# Patient Record
Sex: MALE | Race: WHITE | NOT HISPANIC OR LATINO
[De-identification: names, ages, dates, MRNs, and addresses within clinical notes are randomized per-mention and may not be internally consistent; named-entity substitution may affect disease eponyms.]

---

## 2017-01-04 ENCOUNTER — MYAURORA ACCOUNT LINK (OUTPATIENT)
Dept: OTHER | Age: 77
End: 2017-01-04

## 2017-01-04 ENCOUNTER — PRIOR ORIGINAL RECORDS (OUTPATIENT)
Dept: OTHER | Age: 77
End: 2017-01-04

## 2017-01-10 ENCOUNTER — HOSPITAL ENCOUNTER (OUTPATIENT)
Dept: CV DIAGNOSTICS | Age: 77
Discharge: HOME | End: 2017-01-10
Attending: INTERNAL MEDICINE
Payer: MEDICARE

## 2017-01-10 DIAGNOSIS — R55 SYNCOPE: ICD-10-CM

## 2017-01-10 PROCEDURE — 93226 XTRNL ECG REC<48 HR SCAN A/R: CPT

## 2017-01-10 PROCEDURE — 93225 XTRNL ECG REC<48 HRS REC: CPT

## 2017-01-10 PROCEDURE — 93227 XTRNL ECG REC<48 HR R&I: CPT | Performed by: INTERNAL MEDICINE

## 2017-01-17 ENCOUNTER — PRIOR ORIGINAL RECORDS (OUTPATIENT)
Dept: OTHER | Age: 77
End: 2017-01-17

## 2017-02-02 ENCOUNTER — TELEPHONE (OUTPATIENT)
Dept: FAMILY MEDICINE CLINIC | Facility: CLINIC | Age: 77
End: 2017-02-02

## 2017-02-02 NOTE — TELEPHONE ENCOUNTER
Requesting Clopidogrel  LOV: 12/29/16  RTC: not noted  Last Labs: n/a  Filled: 8/30/16 #90 with 1 refills    Future Appointments  Date Time Provider Ritesh Mary   2/14/2017 1:30 PM Rufus Gutierrez MD [facility] [address] [facility] [address]   7/18/2017 1:30 PM Rufus Gutierrez MD

## 2017-02-05 RX ORDER — CLOPIDOGREL BISULFATE 75 MG/1
75 TABLET ORAL DAILY
Qty: 90 TABLET | Refills: 1 | Status: SHIPPED | OUTPATIENT
Start: 2017-02-05 | End: 2017-09-07

## 2017-02-14 ENCOUNTER — OFFICE VISIT (OUTPATIENT)
Dept: FAMILY MEDICINE CLINIC | Facility: CLINIC | Age: 77
End: 2017-02-14

## 2017-02-14 ENCOUNTER — MYAURORA ACCOUNT LINK (OUTPATIENT)
Dept: OTHER | Age: 77
End: 2017-02-14

## 2017-02-14 ENCOUNTER — PRIOR ORIGINAL RECORDS (OUTPATIENT)
Dept: OTHER | Age: 77
End: 2017-02-14

## 2017-02-14 VITALS
HEIGHT: 73 IN | BODY MASS INDEX: 26.77 KG/M2 | RESPIRATION RATE: 16 BRPM | TEMPERATURE: 98 F | HEART RATE: 60 BPM | SYSTOLIC BLOOD PRESSURE: 136 MMHG | DIASTOLIC BLOOD PRESSURE: 82 MMHG | WEIGHT: 202 LBS

## 2017-02-14 DIAGNOSIS — M54.50 CHRONIC MIDLINE LOW BACK PAIN WITHOUT SCIATICA: ICD-10-CM

## 2017-02-14 DIAGNOSIS — G89.29 CHRONIC MIDLINE LOW BACK PAIN WITHOUT SCIATICA: ICD-10-CM

## 2017-02-14 DIAGNOSIS — R30.0 DYSURIA: ICD-10-CM

## 2017-02-14 DIAGNOSIS — R73.01 IFG (IMPAIRED FASTING GLUCOSE): ICD-10-CM

## 2017-02-14 DIAGNOSIS — I10 ESSENTIAL HYPERTENSION, BENIGN: Primary | ICD-10-CM

## 2017-02-14 LAB
APPEARANCE: CLEAR
MULTISTIX LOT#: NORMAL NUMERIC
PH, URINE: 6.5 (ref 4.5–8)
PROTEIN (URINE DIPSTICK): 30 MG/DL
SPECIFIC GRAVITY: 1.02 (ref 1–1.03)
URINE-COLOR: YELLOW
UROBILINOGEN,SEMI-QN: 0.2 MG/DL (ref 0–1.9)

## 2017-02-14 PROCEDURE — 87086 URINE CULTURE/COLONY COUNT: CPT | Performed by: INTERNAL MEDICINE

## 2017-02-14 PROCEDURE — 99214 OFFICE O/P EST MOD 30 MIN: CPT | Performed by: INTERNAL MEDICINE

## 2017-02-14 PROCEDURE — 81003 URINALYSIS AUTO W/O SCOPE: CPT | Performed by: INTERNAL MEDICINE

## 2017-02-14 RX ORDER — LISINOPRIL 20 MG/1
20 TABLET ORAL DAILY
COMMUNITY
End: 2017-10-10

## 2017-02-14 RX ORDER — HYDROCODONE BITARTRATE AND ACETAMINOPHEN 5; 325 MG/1; MG/1
1 TABLET ORAL EVERY 6 HOURS PRN
Qty: 90 TABLET | Refills: 0 | Status: SHIPPED | OUTPATIENT
Start: 2017-02-14 | End: 2017-05-23

## 2017-02-14 NOTE — PROGRESS NOTES
CC: Patient presents with:  Medication Follow-Up       HPI:     Was diagnosed with September in 2017 with UTI  Having burning sensation and odor again and feels that he may have recurrence     Had lipid panel is 2017 with cards     Was placed on 24 erika mouth daily. Disp:  Rfl:    Ferrous Sulfate (IRON) 325 (65 FE) MG Oral Tab Take  by mouth. Disp:  Rfl:    Atorvastatin Calcium (LIPITOR) 40 MG Oral Tab Take 40 mg by mouth daily. Disp:  Rfl:    Multiple Vitamin (MULTI-VITAMIN OR) Take  by mouth.  Disp:  Rfl Temp(Src) 98 °F (36.7 °C) (Oral)  Resp 16  Ht 73\"  Wt 202 lb  BMI 26.66 kg/m2  GENERAL: well developed, well nourished,in no apparent distress, A/O x3  SKIN: no rashes,no suspicious lesions  HEENT: atraumatic, normocephalic, OP-clear, PERRLA  NECK: supple

## 2017-03-29 RX ORDER — EZETIMIBE 10 MG/1
10 TABLET ORAL NIGHTLY
Qty: 90 TABLET | Refills: 1 | Status: CANCELLED | OUTPATIENT
Start: 2017-03-29

## 2017-03-29 NOTE — TELEPHONE ENCOUNTER
Requesting: Ezetimibe 10mg  LOV: 2/14/17  RTC: 6 months  Last Labs: 12/30/16   Filled: 0 # 0 with 0 refills    Future Appointments  Date Time Provider Ritesh Hernandez   4/25/2017 10:30 AM PF CARD /ECHO ROOM 1 PF CARD [place]   8/28/2017 10:00 AM Alfred Flower

## 2017-03-31 NOTE — TELEPHONE ENCOUNTER
I spoke with patient and he DOES get medication with Dr. Wilmer Oliveira, cardiology. He already spoke with the pharmacy to let them know it needs to go through him.

## 2017-04-01 ENCOUNTER — MED REC SCAN ONLY (OUTPATIENT)
Dept: FAMILY MEDICINE CLINIC | Facility: CLINIC | Age: 77
End: 2017-04-01

## 2017-04-25 ENCOUNTER — HOSPITAL ENCOUNTER (OUTPATIENT)
Dept: CV DIAGNOSTICS | Age: 77
Discharge: HOME | End: 2017-04-25
Attending: INTERNAL MEDICINE
Payer: MEDICARE

## 2017-04-25 ENCOUNTER — PRIOR ORIGINAL RECORDS (OUTPATIENT)
Dept: OTHER | Age: 77
End: 2017-04-25

## 2017-04-25 ENCOUNTER — MYAURORA ACCOUNT LINK (OUTPATIENT)
Dept: OTHER | Age: 77
End: 2017-04-25

## 2017-04-25 DIAGNOSIS — I65.23 BILATERAL CAROTID ARTERY STENOSIS: ICD-10-CM

## 2017-04-26 ENCOUNTER — TELEPHONE (OUTPATIENT)
Dept: FAMILY MEDICINE CLINIC | Facility: CLINIC | Age: 77
End: 2017-04-26

## 2017-04-27 ENCOUNTER — PRIOR ORIGINAL RECORDS (OUTPATIENT)
Dept: OTHER | Age: 77
End: 2017-04-27

## 2017-04-28 ENCOUNTER — PRIOR ORIGINAL RECORDS (OUTPATIENT)
Dept: OTHER | Age: 77
End: 2017-04-28

## 2017-04-28 ENCOUNTER — MYAURORA ACCOUNT LINK (OUTPATIENT)
Dept: OTHER | Age: 77
End: 2017-04-28

## 2017-05-09 ENCOUNTER — HOSPITAL ENCOUNTER (OUTPATIENT)
Dept: CT IMAGING | Age: 77
Discharge: HOME | End: 2017-05-09
Attending: INTERNAL MEDICINE
Payer: MEDICARE

## 2017-05-09 ENCOUNTER — NURSE ONLY (OUTPATIENT)
Dept: LAB | Age: 77
End: 2017-05-09
Attending: INTERNAL MEDICINE
Payer: MEDICARE

## 2017-05-09 ENCOUNTER — PRIOR ORIGINAL RECORDS (OUTPATIENT)
Dept: OTHER | Age: 77
End: 2017-05-09

## 2017-05-09 DIAGNOSIS — I65.29 CAROTID ARTERY STENOSIS: ICD-10-CM

## 2017-05-09 DIAGNOSIS — Z01.818 PRE-OPERATIVE EXAM: Primary | ICD-10-CM

## 2017-05-09 PROCEDURE — 70498 CT ANGIOGRAPHY NECK: CPT | Performed by: INTERNAL MEDICINE

## 2017-05-09 PROCEDURE — 36415 COLL VENOUS BLD VENIPUNCTURE: CPT

## 2017-05-09 PROCEDURE — 80048 BASIC METABOLIC PNL TOTAL CA: CPT

## 2017-05-10 LAB
BUN: 29 MG/DL
CALCIUM: 10.4 MG/DL
CHLORIDE: 108 MEQ/L
CREATININE, SERUM: 1.56 MG/DL
GLUCOSE: 107 MG/DL
POTASSIUM, SERUM: 4.3 MEQ/L
SODIUM: 140 MEQ/L

## 2017-05-11 ENCOUNTER — PRIOR ORIGINAL RECORDS (OUTPATIENT)
Dept: OTHER | Age: 77
End: 2017-05-11

## 2017-05-16 ENCOUNTER — PRIOR ORIGINAL RECORDS (OUTPATIENT)
Dept: OTHER | Age: 77
End: 2017-05-16

## 2017-05-16 ENCOUNTER — LAB ENCOUNTER (OUTPATIENT)
Dept: LAB | Age: 77
End: 2017-05-16
Attending: INTERNAL MEDICINE
Payer: MEDICARE

## 2017-05-16 DIAGNOSIS — N18.9 CHRONIC KIDNEY DISEASE, UNSPECIFIED: ICD-10-CM

## 2017-05-16 DIAGNOSIS — I25.10 CORONARY ATHEROSCLEROSIS OF NATIVE CORONARY ARTERY: Primary | ICD-10-CM

## 2017-05-16 PROCEDURE — 36415 COLL VENOUS BLD VENIPUNCTURE: CPT

## 2017-05-16 PROCEDURE — 80048 BASIC METABOLIC PNL TOTAL CA: CPT

## 2017-05-17 LAB
BUN: 23 MG/DL
CALCIUM: 10.4 MG/DL
CHLORIDE: 111 MEQ/L
CREATININE, SERUM: 1.32 MG/DL
GLUCOSE: 97 MG/DL
POTASSIUM, SERUM: 4.2 MEQ/L
SODIUM: 144 MEQ/L

## 2017-05-18 ENCOUNTER — TELEPHONE (OUTPATIENT)
Dept: FAMILY MEDICINE CLINIC | Facility: CLINIC | Age: 77
End: 2017-05-18

## 2017-05-18 RX ORDER — CILOSTAZOL 100 MG/1
100 TABLET ORAL 2 TIMES DAILY
Qty: 180 TABLET | Refills: 0 | Status: SHIPPED | OUTPATIENT
Start: 2017-05-18 | End: 2017-05-18

## 2017-05-18 RX ORDER — CILOSTAZOL 100 MG/1
100 TABLET ORAL 2 TIMES DAILY
Qty: 180 TABLET | Refills: 0 | Status: SHIPPED | OUTPATIENT
Start: 2017-05-18 | End: 2017-05-23

## 2017-05-18 NOTE — TELEPHONE ENCOUNTER
I spoke to the patient to let him know the refill was sent to his local pharmacy, he said it was to be sent to mail order. He will be out of med so I called [facility] to okay a 2 week supply since  Already approved.   [facility] will change to #14 and I tried to loc

## 2017-05-18 NOTE — TELEPHONE ENCOUNTER
We do not get samples of pletal, I called patient to discuss. We could send a short-term supply to local pharmacy? Refill request received 5/16/17 and pended to MD - awaiting approval.  Patient does not answer at phone number given.

## 2017-05-23 ENCOUNTER — OFFICE VISIT (OUTPATIENT)
Dept: FAMILY MEDICINE CLINIC | Facility: CLINIC | Age: 77
End: 2017-05-23

## 2017-05-23 VITALS
SYSTOLIC BLOOD PRESSURE: 128 MMHG | BODY MASS INDEX: 26.75 KG/M2 | HEART RATE: 72 BPM | DIASTOLIC BLOOD PRESSURE: 74 MMHG | WEIGHT: 201.81 LBS | HEIGHT: 73 IN | TEMPERATURE: 98 F | RESPIRATION RATE: 16 BRPM

## 2017-05-23 DIAGNOSIS — M54.50 CHRONIC MIDLINE LOW BACK PAIN WITHOUT SCIATICA: Primary | ICD-10-CM

## 2017-05-23 DIAGNOSIS — I65.23 BILATERAL CAROTID ARTERY STENOSIS: ICD-10-CM

## 2017-05-23 DIAGNOSIS — R30.0 DYSURIA: ICD-10-CM

## 2017-05-23 DIAGNOSIS — G89.29 CHRONIC MIDLINE LOW BACK PAIN WITHOUT SCIATICA: Primary | ICD-10-CM

## 2017-05-23 PROBLEM — I65.29 CAROTID ARTERY STENOSIS: Status: ACTIVE | Noted: 2017-05-23

## 2017-05-23 PROCEDURE — 99214 OFFICE O/P EST MOD 30 MIN: CPT | Performed by: INTERNAL MEDICINE

## 2017-05-23 RX ORDER — HYDROCODONE BITARTRATE AND ACETAMINOPHEN 5; 325 MG/1; MG/1
1 TABLET ORAL EVERY 6 HOURS PRN
Qty: 90 TABLET | Refills: 0 | Status: SHIPPED | OUTPATIENT
Start: 2017-05-23 | End: 2018-01-01

## 2017-05-23 RX ORDER — CILOSTAZOL 100 MG/1
100 TABLET ORAL 2 TIMES DAILY
Qty: 180 TABLET | Refills: 2 | Status: SHIPPED | OUTPATIENT
Start: 2017-05-23 | End: 2018-01-01

## 2017-05-23 NOTE — PROGRESS NOTES
CC: Patient presents with:  Medication Follow-Up: need refills on Norco  Other: discuss back and carotid artery       HPI:     Had carotid US and CTA   Recommended to see Dr. Quiana Estess. Blockage went from 59-63%   Denies any lightheadedness or dizziness. 10 mg by mouth nightly.  Disp:  Rfl:       Past Medical History   Diagnosis Date   • Other abnormal glucose    • Acute pharyngitis    • Benign neoplasm of adrenal gland    • Hypertrophy of prostate without urinary obstruction and other lower urinary tract s adenopathy  LUNGS: clear to auscultation bilaterally   CARDIO: RRR without murmur  GI: good BS's,NT/ND, no masses or HSM  EXTREMITIES: no cyanosis, no clubbing, no edema      Orders Placed This Encounter  UA/M With Culture Reflex [E]  Standing Status: Futu

## 2017-05-24 ENCOUNTER — APPOINTMENT (OUTPATIENT)
Dept: LAB | Age: 77
End: 2017-05-24
Attending: INTERNAL MEDICINE
Payer: MEDICARE

## 2017-05-24 DIAGNOSIS — R30.0 DYSURIA: ICD-10-CM

## 2017-05-24 PROCEDURE — 81003 URINALYSIS AUTO W/O SCOPE: CPT | Performed by: INTERNAL MEDICINE

## 2017-06-20 RX ORDER — MONTELUKAST SODIUM 10 MG/1
10 TABLET ORAL DAILY
Qty: 90 TABLET | Refills: 3 | Status: SHIPPED | OUTPATIENT
Start: 2017-06-20 | End: 2018-01-01

## 2017-06-20 NOTE — TELEPHONE ENCOUNTER
Refill protocol failed because the patient did not meet the protocol criteria.     Requesting singulair 10mg  LOV: 5/23/17  RTC: 3 months  Last Labs:   Filled: 6/24/16 # 90  with 3 refills    Future Appointments  Date Time Provider Ritesh Hernandez   7/18/

## 2017-07-10 PROBLEM — I50.22 CHRONIC SYSTOLIC CONGESTIVE HEART FAILURE (HCC): Status: ACTIVE | Noted: 2017-07-10

## 2017-07-10 PROBLEM — I25.810 CORONARY ARTERY DISEASE INVOLVING CORONARY BYPASS GRAFT OF NATIVE HEART WITHOUT ANGINA PECTORIS: Status: ACTIVE | Noted: 2017-07-10

## 2017-07-17 ENCOUNTER — OFFICE VISIT (OUTPATIENT)
Dept: FAMILY MEDICINE CLINIC | Facility: CLINIC | Age: 77
End: 2017-07-17

## 2017-07-17 VITALS
WEIGHT: 193 LBS | BODY MASS INDEX: 25.58 KG/M2 | DIASTOLIC BLOOD PRESSURE: 60 MMHG | TEMPERATURE: 97 F | SYSTOLIC BLOOD PRESSURE: 100 MMHG | HEIGHT: 73 IN | HEART RATE: 112 BPM | RESPIRATION RATE: 16 BRPM

## 2017-07-17 DIAGNOSIS — I25.810 CORONARY ARTERY DISEASE INVOLVING CORONARY BYPASS GRAFT OF NATIVE HEART WITHOUT ANGINA PECTORIS: ICD-10-CM

## 2017-07-17 DIAGNOSIS — Z09 HOSPITAL DISCHARGE FOLLOW-UP: Primary | ICD-10-CM

## 2017-07-17 DIAGNOSIS — I10 ESSENTIAL HYPERTENSION, BENIGN: ICD-10-CM

## 2017-07-17 PROCEDURE — 99214 OFFICE O/P EST MOD 30 MIN: CPT | Performed by: PHYSICIAN ASSISTANT

## 2017-07-17 NOTE — PROGRESS NOTES
[facility] Internal Medicine Progress Note    CC:  Patient presents with:  Hospital F/U: [facility] 06/25/17 thru 07/04/17 for MI. S/P Cardiac Cath, did f/u with cardiology Dr. Loni Agosto on 07/10/17. Today denies having any CP.       HPI:   HPI Oral Tablet 24 Hr Take 1.5 tablets (75 mg total) by mouth once daily. Disp: 30 tablet Rfl: 6   tamsulosin HCl 0.4 MG Oral Cap Take 1 capsule (0.4 mg total) by mouth once daily.  Disp: 90 capsule Rfl: 0   Montelukast Sodium (SINGULAIR) 10 MG Oral Tab Take 1 kg/m². Physical Exam   Constitutional: He is oriented to person, place, and time and well-developed, well-nourished, and in no distress. HENT:   Mouth/Throat: Oropharynx is clear and moist. No oropharyngeal exudate.    Eyes: EOM are normal. Pupils are eq Education: There are no barriers to learning. Medical education done. Outcome: Patient verbalizes understanding.     Problem List:  Patient Active Problem List:     Other B-complex deficiencies     Vitamin D deficiency     Atherosclerosis of native arteries

## 2017-07-17 NOTE — PATIENT INSTRUCTIONS
Thank you for choosing David Hansen PA-C at [facility]  To Do: [address] to continue medications as prescribed  2. Pt to follow-up with cardiology and surgeon as scheduled  3.  Follow-up in 1 month, sooner if problems    Effect treatment even beyond those discussed today.  All therapies have potential risk of harm or side effects or medication interactions.  It is your duty and for your safety to discuss with the pharmacist and our office with questions, and to notify us and stop

## 2017-07-18 ENCOUNTER — TELEPHONE (OUTPATIENT)
Dept: FAMILY MEDICINE CLINIC | Facility: CLINIC | Age: 77
End: 2017-07-18

## 2017-07-18 NOTE — TELEPHONE ENCOUNTER
Rcvd medical records from [facility], admit date 6/25/17.  49 pages. Reviewed by MD and sent to scan.

## 2017-07-25 RX ORDER — FAMOTIDINE 20 MG/1
TABLET ORAL
Qty: 180 TABLET | Refills: 1 | Status: SHIPPED | OUTPATIENT
Start: 2017-07-25 | End: 2018-01-01

## 2017-07-25 NOTE — TELEPHONE ENCOUNTER
Time started: 440     Time ended: 441    Total time spent on chart: 1 min    Refill protocol passed because the patient met the following protocol for    Requesting famotidine 20mg  LOV:  5/23/17  RTC: PRN  Last Labs:        Future Appointments  Date Time P

## 2017-08-07 ENCOUNTER — TELEPHONE (OUTPATIENT)
Dept: FAMILY MEDICINE CLINIC | Facility: CLINIC | Age: 77
End: 2017-08-07

## 2017-08-07 NOTE — TELEPHONE ENCOUNTER
[facility] mail service sts they did not receive rx from 7/25/17. Requesting to resend.     FAMOTIDINE 20 MG Oral Tab 180 tablet 1 7/25/2017     Sig: TAKE 1 TABLET BY MOUTH TWICE DAILY    E-Prescribing Status: Receipt confirmed by pharmacy (7/25/2017  4:41 P

## 2017-08-07 NOTE — TELEPHONE ENCOUNTER
Spoke to Олег who states they did not receive the script sent over on 7/25/17, verbal given to pharmacists. Verbalized understanding, no other questions at this time.     Medication Detail    Disp Refills Start End    FAMOTIDINE 20 MG Oral T

## 2017-08-28 ENCOUNTER — OFFICE VISIT (OUTPATIENT)
Dept: FAMILY MEDICINE CLINIC | Facility: CLINIC | Age: 77
End: 2017-08-28

## 2017-08-28 VITALS
WEIGHT: 195 LBS | HEART RATE: 72 BPM | BODY MASS INDEX: 25.84 KG/M2 | TEMPERATURE: 98 F | HEIGHT: 73 IN | SYSTOLIC BLOOD PRESSURE: 130 MMHG | RESPIRATION RATE: 16 BRPM | DIASTOLIC BLOOD PRESSURE: 66 MMHG

## 2017-08-28 DIAGNOSIS — M85.89 OSTEOPENIA OF MULTIPLE SITES: ICD-10-CM

## 2017-08-28 DIAGNOSIS — R73.01 IFG (IMPAIRED FASTING GLUCOSE): ICD-10-CM

## 2017-08-28 DIAGNOSIS — I25.810 CORONARY ARTERY DISEASE INVOLVING CORONARY BYPASS GRAFT OF NATIVE HEART WITHOUT ANGINA PECTORIS: ICD-10-CM

## 2017-08-28 DIAGNOSIS — I25.10 ATHEROSCLEROSIS OF NATIVE CORONARY ARTERY OF NATIVE HEART WITHOUT ANGINA PECTORIS: ICD-10-CM

## 2017-08-28 DIAGNOSIS — I70.213 ATHEROSCLEROSIS OF NATIVE ARTERY OF BOTH LOWER EXTREMITIES WITH INTERMITTENT CLAUDICATION (HCC): ICD-10-CM

## 2017-08-28 DIAGNOSIS — J61 ASBESTOSIS(501): ICD-10-CM

## 2017-08-28 DIAGNOSIS — E53.8 B12 DEFICIENCY: ICD-10-CM

## 2017-08-28 DIAGNOSIS — E78.5 DYSLIPIDEMIA: ICD-10-CM

## 2017-08-28 DIAGNOSIS — E55.9 VITAMIN D DEFICIENCY: ICD-10-CM

## 2017-08-28 DIAGNOSIS — M54.50 CHRONIC MIDLINE LOW BACK PAIN WITHOUT SCIATICA: ICD-10-CM

## 2017-08-28 DIAGNOSIS — I65.29 OCCLUSION AND STENOSIS OF CAROTID ARTERY: ICD-10-CM

## 2017-08-28 DIAGNOSIS — M15.9 PRIMARY OSTEOARTHRITIS INVOLVING MULTIPLE JOINTS: ICD-10-CM

## 2017-08-28 DIAGNOSIS — I73.9 PERIPHERAL VASCULAR DISEASE (HCC): ICD-10-CM

## 2017-08-28 DIAGNOSIS — D50.0 IRON DEFICIENCY ANEMIA DUE TO CHRONIC BLOOD LOSS: ICD-10-CM

## 2017-08-28 DIAGNOSIS — I10 ESSENTIAL HYPERTENSION, BENIGN: ICD-10-CM

## 2017-08-28 DIAGNOSIS — I50.22 CHRONIC SYSTOLIC CONGESTIVE HEART FAILURE (HCC): ICD-10-CM

## 2017-08-28 DIAGNOSIS — G89.29 CHRONIC MIDLINE LOW BACK PAIN WITHOUT SCIATICA: ICD-10-CM

## 2017-08-28 DIAGNOSIS — E04.1 NONTOXIC UNINODULAR GOITER: ICD-10-CM

## 2017-08-28 DIAGNOSIS — D12.6 BENIGN NEOPLASM OF COLON, UNSPECIFIED PART OF COLON: ICD-10-CM

## 2017-08-28 DIAGNOSIS — N18.30 STAGE 3 CHRONIC KIDNEY DISEASE (HCC): ICD-10-CM

## 2017-08-28 DIAGNOSIS — Z00.00 ANNUAL PHYSICAL EXAM: Primary | ICD-10-CM

## 2017-08-28 DIAGNOSIS — N40.0 PROSTATE HYPERTROPHY: ICD-10-CM

## 2017-08-28 PROCEDURE — 96160 PT-FOCUSED HLTH RISK ASSMT: CPT | Performed by: INTERNAL MEDICINE

## 2017-08-28 PROCEDURE — G0439 PPPS, SUBSEQ VISIT: HCPCS | Performed by: INTERNAL MEDICINE

## 2017-08-28 NOTE — PROGRESS NOTES
Beny Clark is a 68year old male who presents for a MA Supervisit.          Patient Care Team: Patient Care Team:  Bhavin Jackson MD as PCP - General (Internal Medicine)  Dmaon Murphy MD (Internal Medicine)    Patient Active Problem List:     Other B- capsule (120 mg total) by mouth daily. Disp: 90 capsule Rfl: 3   FAMOTIDINE 20 MG Oral Tab TAKE 1 TABLET BY MOUTH TWICE DAILY Disp: 180 tablet Rfl: 1   aspirin 325 MG Oral Tab Take 325 mg by mouth daily.  Disp:  Rfl:    Metoprolol Succinate ER 50 MG Oral Ta Lab Results  Component Value Date   TRIG 87 12/30/2016   TRIG 45 12/02/2015   TRIG 62 06/04/2015       Lab Results  Component Value Date   LDL 29 12/30/2016   LDL 43 12/02/2015   LDL 44 06/04/2015       Lab Results  Component Value Date   AST 26 07/2 Problems? : No    Difficulty walking?: No    Difficulty dressing or bathing?: No    Problems with daily activities? : No    Memory Problems?: No      Fall/Risk Assessment     Do you have 3 or more medical conditions?: 1-Yes    Have you fallen in the last 1 Cholesterol (mg/dL)   Date Value   12/30/2016 29        EKG - w/ Initial Preventative Physical Exam only, or if medically necessary S/p     Colorectal Cancer Screening      Colonoscopy Screen every 10 years Colonoscopy,3 Years due on 03/15/2018 Update Heal Creatinine (mg/dL)   Date Value   07/24/2017 1.65 (H)    No flowsheet data found. Digoxin Serum Conc  Annually No results found for: DIGOXIN No flowsheet data found.     Diabetes      HgbA1C  Annually HGBA1C (%)   Date Value   12/09/2013 6.1 (H) Disp: 90 tablet Rfl: 1   Ferrous Sulfate (IRON) 325 (65 FE) MG Oral Tab Take 1 tablet by mouth daily. Disp:  Rfl:    Atorvastatin Calcium (LIPITOR) 40 MG Oral Tab Take 40 mg by mouth daily.  Disp:  Rfl:    Multiple Vitamin (MULTI-VITAMIN OR) Take  by mout heartburn  : 1  per night nocturia, no complaint of urinary incontinence  MUSCULOSKELETAL: denies back pain  NEURO: denies headaches  PSYCHE: denies depression or anxiety  HEMATOLOGIC: denies hx of anemia  ENDOCRINE: denies thyroid history  ALL/ASTHMA: d up pema with cardiology     Chronic systolic congestive heart failure ([address]  -stable controlled   -continue current management  -reviewed recent echo    Chronic midline low back pain without sciatica  -norco prn severe pain but patient continues to tolerate Administered Date(s) Administered   • Pneumococcal (Prevnar 13) 03/19/2015   • Pneumovax 23 11/14/2012   • TDAP 03/19/2015

## 2017-08-28 NOTE — PATIENT INSTRUCTIONS
Pradip James Jr.'s SCREENING SCHEDULE   Tests on this list are recommended by your physician but may not be covered, or covered at this frequency, by your insurer. Please check with your insurance carrier before scheduling to verify coverage.     PREVENT previous visit.  Limited to patients who meet one of the following criteria:   • Men who are 73-68 years old and have smoked more than 100 cigarettes in their lifetime   • Anyone with a family history    Colorectal Cancer Screening Covered up to Age 76 Hemophiliacs who received Factor VIII or IX concentrates   Clients of institutions for the mentally retarded   Persons who live in the same house as a HepB virus carrier   Homosexual men   Illicit injectable drug abusers     Tetanus Toxoid- Only covered

## 2017-09-07 RX ORDER — CLOPIDOGREL BISULFATE 75 MG/1
75 TABLET ORAL DAILY
Qty: 90 TABLET | Refills: 1 | Status: SHIPPED | OUTPATIENT
Start: 2017-09-07 | End: 2018-01-01

## 2017-09-07 NOTE — TELEPHONE ENCOUNTER
Requesting clopidogrel  LOV: 8/28/17  RTC: 4 months  Last Labs: n/a  Filled: 2/5/17 #90 with 1 refills    Non protocol med pended for approval  Time started: 402    Time ended: 404    Total time spent on chart: 2 min    Future Appointments  Date Time Provi

## 2017-10-10 PROBLEM — I71.4 ABDOMINAL AORTIC ANEURYSM (AAA) WITHOUT RUPTURE (HCC): Status: ACTIVE | Noted: 2017-10-10

## 2018-01-01 PROCEDURE — 82043 UR ALBUMIN QUANTITATIVE: CPT | Performed by: INTERNAL MEDICINE

## 2018-01-01 PROCEDURE — 87077 CULTURE AEROBIC IDENTIFY: CPT | Performed by: EMERGENCY MEDICINE

## 2018-01-01 PROCEDURE — 87086 URINE CULTURE/COLONY COUNT: CPT | Performed by: EMERGENCY MEDICINE

## 2018-01-01 PROCEDURE — 82570 ASSAY OF URINE CREATININE: CPT | Performed by: INTERNAL MEDICINE

## 2018-01-01 PROCEDURE — 82607 VITAMIN B-12: CPT | Performed by: INTERNAL MEDICINE

## 2018-01-01 RX ORDER — FAMOTIDINE 20 MG/1
20 TABLET ORAL 2 TIMES DAILY
Qty: 180 TABLET | Refills: 3 | Status: SHIPPED | OUTPATIENT
Start: 2018-01-01 | End: 2019-03-30

## 2018-03-30 NOTE — TELEPHONE ENCOUNTER
Pending Prescriptions Disp Refills    FAMOTIDINE 20 MG Oral Tab [Pharmacy Med Name: FAMOTIDINE 20 MG TABLET] 180 tablet 3     Sig: TAKE 1 TABLET (20 MG TOTAL) BY MOUTH 2 (TWO) TIMES DAILY. Refill approved per protocol.

## 2018-06-14 PROBLEM — E27.9 LESION OF ADRENAL GLAND (HCC): Status: ACTIVE | Noted: 2018-01-01

## 2018-06-14 PROBLEM — I70.0 ATHEROSCLEROSIS OF AORTA (HCC): Status: ACTIVE | Noted: 2018-01-01

## 2018-10-11 PROBLEM — Z87.891 HISTORY OF SMOKING 30 OR MORE PACK YEARS: Status: ACTIVE | Noted: 2018-01-01

## 2018-11-08 ENCOUNTER — PRIOR ORIGINAL RECORDS (OUTPATIENT)
Dept: OTHER | Age: 78
End: 2018-11-08

## 2018-11-09 ENCOUNTER — PRIOR ORIGINAL RECORDS (OUTPATIENT)
Dept: OTHER | Age: 78
End: 2018-11-09

## 2018-11-26 ENCOUNTER — PRIOR ORIGINAL RECORDS (OUTPATIENT)
Dept: OTHER | Age: 78
End: 2018-11-26

## 2018-12-31 ENCOUNTER — PRIOR ORIGINAL RECORDS (OUTPATIENT)
Dept: OTHER | Age: 78
End: 2018-12-31

## 2019-03-01 VITALS
HEART RATE: 56 BPM | SYSTOLIC BLOOD PRESSURE: 122 MMHG | WEIGHT: 200 LBS | HEIGHT: 70 IN | BODY MASS INDEX: 28.63 KG/M2 | DIASTOLIC BLOOD PRESSURE: 74 MMHG

## 2019-06-24 NOTE — TELEPHONE ENCOUNTER
Does he get this through his cardiologist ? Manual Repair Warning Statement: We plan on removing the manually selected variable below in favor of our much easier automatic structured text blocks found in the previous tab. We decided to do this to help make the flow better and give you the full power of structured data. Manual selection is never going to be ideal in our platform and I would encourage you to avoid using manual selection from this point on, especially since I will be sunsetting this feature. It is important that you do one of two things with the customized text below. First, you can save all of the text in a word file so you can have it for future reference. Second, transfer the text to the appropriate area in the Library tab. Lastly, if there is a flap or graft type which we do not have you need to let us know right away so I can add it in before the variable is hidden. No need to panic, we plan to give you roughly 6 months to make the change.

## 2020-10-11 VITALS — HEIGHT: 73 IN | SYSTOLIC BLOOD PRESSURE: 110 MMHG | DIASTOLIC BLOOD PRESSURE: 72 MMHG | BODY MASS INDEX: 26.39 KG/M2

## 2023-12-26 NOTE — TELEPHONE ENCOUNTER
Requesting Cilostazol  LOV: 2/14/17  RTC: 6 months  Last Labs: n/a  Filled: 3/17/16 #180 with 3 refills    Future Appointments  Date Time Provider Ritesh Mary   5/23/2017 10:30 AM Rufus Gutierrez MD [facility] [address]   8/28/2017 10:00 AM Rufus Gutierrez MD
No

## 2024-09-01 NOTE — TELEPHONE ENCOUNTER
[facility] MEDICINE PROGRESS NOTE   Patient: Wilmer Horne  Today's Date: 2024    YOB: 1942  Admission Date: 2024    MRN: [id number]  Inpatient LOS: 2    Room: 252/02  Hospital Day: Hospital Day: 3    Subjective   HISTORY AND SUBJECTIVE COMPLAINTS     Chief Complaint:   Shortness of breath, hypoxia    Interval History / Subjective:   Patient feeling good this morning, he has no complaints, he wants to go home. He remains on oxygen and overnight HR was in the 110s.     Hospital Course:  Wilmer Horne is a 82 year old male who presented on 2024 with complaints of Shortness of Breath  .    ROS:  Pertinent systems negative except as above.    Objective   PHYSICAL EXAMINATION     Vital 24 Hour Range Most Recent Value   Temperature Temp  Min: 97.3 °F (36.3 °C)  Max: 97.7 °F (36.5 °C) 97.5 °F (36.4 °C)   Pulse Pulse  Min: 74  Max: 120 (!) 116   Respiratory Resp  Min: 16  Max: 18 16   Blood Pressure BP  Min: 100/55  Max: 108/69 106/69   Pulse Oximetry SpO2  Min: 88 %  Max: 96 % 93 %   Arterial BP No data recorded     O2 O2 Flow Rate (L/min)  Av L/min  Min: 2 L/min   Min taken time: 24  Max: 2 L/min   Max taken time: 24 032       Recorded Intake and Output:  Intake/Output Summary (Last 24 hours) at 2024 0848  Last data filed at 2024 2258  Gross per 24 hour   Intake 750 ml   Output 300 ml   Net 450 ml      Recorded Last Stool Occurrence:       Vital Most Recent Value First Value   Weight 84.4 kg (186 lb 1.1 oz) Weight: 86.6 kg (191 lb)   Height 5' 11\" (180.3 cm) Height: 5' 11\" (180.3 cm)   BMI 25.95 N/A     GEN: Alert, oriented x 3.  No acute distress.  HEENT: PERRL, oral mucous membranes moist.  CV: Regular rate and rhythm.  CHEST: velcro crackles to posterior bases, normal respiratory effort. On 2L NC  ABDOMEN: Soft, non-tender, non-distended, active bowel sounds.  EXT: BLE resolved, no cyanosis or clubbing.  SKIN: Warm and dry.  Requesting plavix  LOV: 12/29/16  RTC: 2 months  Last Labs: 12/30/16  Filled: 8/30/16 #90 with 1 refills    Future Appointments  Date Time Provider Ritesh Hernandez   2/14/2017 1:30 PM Symone Chaves MD [facility] [address]   7/18/2017 1:30 PM Symone Chaves MD No rashes.    TEST RESULTS     Labs: The Laboratory values listed below have been reviewed and pertinent findings discussed in the Assessment and Plan.    Laboratory values:   Recent Labs   Lab 09/01/24  0412 08/31/24  0531 08/30/24  1000   WBC 9.7 5.2 6.8   HGB 11.5* 11.7* 12.0*   HCT 34.8* 34.2* 36.4*    194 170         Recent Labs   Lab 09/01/24  0412 08/31/24  0531 08/30/24  1149 08/30/24  1000   SODIUM 138 136  --  138   POTASSIUM 3.9 3.6  --  4.1   CHLORIDE 100 100  --  103   CO2 31 29  --  28   CALCIUM 8.5 8.4  --  8.5   GLUCOSE 176* 141*  --  107*   BUN 43* 23*  --  19   CREATININE 1.19* 0.95  --  0.87   MG  --  2.0 2.1  --         Recent Labs   Lab 08/30/24  1000   ALBUMIN 2.5*   AST 29   GPT 20   BILIRUBIN 0.9     Recent Labs   Lab 08/30/24  1000   PCT <0.05   LACTA 1.7       Radiology: Imaging studies have been reviewed and pertinent findings discussed in the Assessment and Plan.  Results for orders placed or performed during the hospital encounter of 08/30/24 (from the past 48 hour(s))   XR CHEST AP OR PA    Impression    IMPRESSION:  1. There is generalized prominence of the bronchovascular markings. This is  due in part to the patient's known subpleural interstitial fibrosis.  Superimposed pulmonary edema is raised.    2. There is a left-sided retrocardiac opacity which could be on the basis  of atelectasis versus infiltrate.    Electronically Signed by: Ryan Metcalf MD  Signed on: 8/30/2024 10:56 AM  Created on Workstation ID: [id number]  Signed on Workstation ID: [id number]   CTA CHEST PULMONARY EMBOLISM    Impression    IMPRESSION:    1.  No evidence for pulmonary embolism. Visualization of small subsegmental  pulm arteries is suboptimal due to motion and peripheral lung infiltrates.  However no significant emboli are evident.  2.  Mediastinal adenopathy, worse than before. Although possibly reactive,  neoplastic involvement is not excluded and clinical correlation is  necessary. The largest  lymph node is now 2.5 cm in the right paratracheal  region as opposed to 1.6 cm on prior. Appropriate pulmonary consultation  may be warranted.  3.  There are now extensive groundglass and alveolar lung infiltrates as  well as some interstitial thickening. Findings most suggestive of edema  although could be infectious or inflammatory. This is on a background of  chronic peripheral fibrosis.  4.  Prominent cardiomegaly. Reflux of contrast into the IVC and hepatic  veins which can be seen with right-sided heart failure.  5.  Stable distended pulmonary artery suggesting pulmonary hypertension.  Stable ascending thoracic aortic aneurysm.  6.  Other chronic findings as described.        Electronically Signed by: Gagandeep Guzman MD  Signed on: 8/30/2024 12:55 PM  Created on Workstation ID: [id number]  Signed on Workstation ID: [id number]        ANCILLARY ORDERS     Diet:  One Time Diet Regular  Cardiac; Fluid Restrict 1800ml (1020 From Dietary) Diet  Telemetry: On  Consults:    IP CONSULT TO PULMONOLOGY  IP CONSULT TO CARDIAC REHAB  IP CONSULT TO CASE MANAGEMENT  IP CONSULT TO SMOKING CESSATION PROGRAM  IP CONSULT TO SOCIAL WORK  Therapy Orders:   PT and OT Orders Placed this Encounter   Procedures    Occupational Therapy Evaluation    Occupational Therapy Treatment    Physical Therapy Evaluation    Physical Therapy Treatment       Advance Care Planning    ADVANCED DIRECTIVES     Code Status: Full Resuscitation        ASSESSMENT AND PLAN   Acute hypoxic respiratory failure  ILD flare?  Worsening mediastinal adenopathy on CT  CTA chest negative for PE, worsening mediastinal adenopathy, largest lymph node is now 2.5 cm in the right paratracheal region as opposed to 1.6 cm on prior; and extensive groundglass and alveolar lung infiltrates as well as some interstitial thickening. Findings most suggestive of edema although could be infectious or inflammatory  Pulmonary following, continue abx, steroids, cont to wean O2    New  cardiomyopathy  Acute on chronic systolic heart failure exacerbation  Moderate pulmonary HTN  ECHO with EF reduced from 67% to 37%, moderate pulmonary HTN, RSVP 49mmHg, trivial pericardial effusion  Cardiology consulted- discussed with Dr. Garcia, cont diuretics     Elevated troponin  Initial troponin 186, has peaked at 263  Denies any chest pain, EKG showed sinus tachycardia, RBBB  Could be from heart failure excerebration, echo as above     Atrial fibrillation  Follows with Dr. Grande in the clinic, s/p ablation  Continue eliquis, on amiodarone, diltiazem- resumed     HTN  HLD  Resume home meds     Renal cell carcinoma  Being followed by urology Dr. Patel for monitoring with surveillance imaging     Smoking status: former smoker    Nutrition status: appropriate  Body mass index is 25.95 kg/m². - Overweight BMI 25-29  DVT Prophylaxis: Eliquis       DISCHARGE PLANNING     The patient's treatment plans were discussed with patient.     Recommendations for Discharge   SW Home   PT     OT     SLP        Anticipated discharge destination: Home  Barriers to Discharge: Patient is not medically ready and needs to remain in the hospital today due to acute medical issues    Sarath Hernandez, [facility]  09/01/24  8:48 AM    Seen and examined today.  Patient continues to say he feels well although he still on 2 L of oxygen.  Edema has resolved and lung sounds are improved as well.  Echo did show a reduction in his ejection fraction from previous.  Will continue steroids, antibiotics.  Cardiology consultation for new cardiomyopathy.    Signed:  Dg Tong DO  9/1/2024  9:50 AM

## (undated) NOTE — MR AVS SNAPSHOT
[facility] [address]  [place]  [phone]               Thank you for choosing us for your health care visit with Jaelyn Veliz MD.  We are glad to serve you and happy to provide you with Carol Navas Take 240 mg by mouth daily. Commonly known as:  TIAZAC           famoTIDine 20 MG Tabs   Take 1 tablet (20 mg total) by mouth 2 (two) times daily.    Commonly known as:  PEPCID           finasteride 5 MG Tabs   Take 1 tablet (5 mg total) by mouth once jenn Results of Recent Testing     URINALYSIS, AUTO, W/O SCOPE      Component Value Standard Range & Units    GLUCOSE (URINE DIPSTICK) neg Negative mg/dL    BILIRUBIN neg Negative    KETONES (URINE DIPSTICK) neg Negative mg/dL    SPECIFIC GRAVITY 1.020 1.005 -

## (undated) NOTE — MR AVS SNAPSHOT
[facility] [address]  [place]  [phone]               Thank you for choosing us for your health care visit with Aldair Eckert MD.  We are glad to serve you and happy to provide you with [facility] aspirin 81 MG Tabs   Take 81 mg by mouth daily. cilostazol 100 MG Tabs   Take 1 tablet (100 mg total) by mouth 2 (two) times daily.    Commonly known as:  PLETAL           Clopidogrel Bisulfate 75 MG Tabs   Take 1 tablet (75 mg total) by mouth da You can access your MyChart to more actively manage your health care and view more details from this visit by going to [email or web address].   If you've recently had a stay at the Hospital you can access your discharge instructions in [address] by [email or web address] You don’t need to join a gym. Home exercises work great.  Put more priority on exercise in your life                    Visit [facility] online at  [email or web address]